# Patient Record
Sex: MALE | Race: OTHER | HISPANIC OR LATINO | Employment: UNEMPLOYED | URBAN - METROPOLITAN AREA
[De-identification: names, ages, dates, MRNs, and addresses within clinical notes are randomized per-mention and may not be internally consistent; named-entity substitution may affect disease eponyms.]

---

## 2023-07-03 ENCOUNTER — HOSPITAL ENCOUNTER (EMERGENCY)
Facility: HOSPITAL | Age: 38
Discharge: HOME | End: 2023-07-03
Attending: EMERGENCY MEDICINE
Payer: MEDICAID

## 2023-07-03 VITALS
DIASTOLIC BLOOD PRESSURE: 83 MMHG | HEIGHT: 66 IN | RESPIRATION RATE: 18 BRPM | OXYGEN SATURATION: 98 % | BODY MASS INDEX: 23.95 KG/M2 | SYSTOLIC BLOOD PRESSURE: 122 MMHG | TEMPERATURE: 98 F | HEART RATE: 75 BPM | WEIGHT: 149 LBS

## 2023-07-03 DIAGNOSIS — T14.8XXA PUNCTURE WOUND: ICD-10-CM

## 2023-07-03 PROCEDURE — 25000003 PHARM REV CODE 250: Mod: ER | Performed by: EMERGENCY MEDICINE

## 2023-07-03 PROCEDURE — 90715 TDAP VACCINE 7 YRS/> IM: CPT | Mod: ER | Performed by: EMERGENCY MEDICINE

## 2023-07-03 PROCEDURE — 63600175 PHARM REV CODE 636 W HCPCS: Mod: ER | Performed by: EMERGENCY MEDICINE

## 2023-07-03 PROCEDURE — 99284 EMERGENCY DEPT VISIT MOD MDM: CPT | Mod: ER

## 2023-07-03 PROCEDURE — 90471 IMMUNIZATION ADMIN: CPT | Mod: ER | Performed by: EMERGENCY MEDICINE

## 2023-07-03 RX ORDER — CIPROFLOXACIN 500 MG/1
500 TABLET ORAL 2 TIMES DAILY
Qty: 18 TABLET | Refills: 0 | Status: SHIPPED | OUTPATIENT
Start: 2023-07-03 | End: 2023-07-12

## 2023-07-03 RX ORDER — CEPHALEXIN 500 MG/1
500 CAPSULE ORAL
Status: COMPLETED | OUTPATIENT
Start: 2023-07-03 | End: 2023-07-03

## 2023-07-03 RX ORDER — CIPROFLOXACIN 500 MG/1
500 TABLET ORAL
Status: COMPLETED | OUTPATIENT
Start: 2023-07-03 | End: 2023-07-03

## 2023-07-03 RX ORDER — CEPHALEXIN 250 MG/1
500 CAPSULE ORAL EVERY 6 HOURS
Qty: 38 CAPSULE | Refills: 0 | Status: SHIPPED | OUTPATIENT
Start: 2023-07-03 | End: 2023-07-08

## 2023-07-03 RX ORDER — ACETAMINOPHEN 500 MG
1000 TABLET ORAL
Status: COMPLETED | OUTPATIENT
Start: 2023-07-03 | End: 2023-07-03

## 2023-07-03 RX ADMIN — ACETAMINOPHEN 1000 MG: 500 TABLET ORAL at 10:07

## 2023-07-03 RX ADMIN — TETANUS TOXOID, REDUCED DIPHTHERIA TOXOID AND ACELLULAR PERTUSSIS VACCINE, ADSORBED 0.5 ML: 5; 2.5; 8; 8; 2.5 SUSPENSION INTRAMUSCULAR at 10:07

## 2023-07-03 RX ADMIN — CEPHALEXIN 500 MG: 500 CAPSULE ORAL at 10:07

## 2023-07-03 RX ADMIN — CIPROFLOXACIN 500 MG: 500 TABLET, FILM COATED ORAL at 10:07

## 2023-07-04 NOTE — ED PROVIDER NOTES
Encounter Date: 7/3/2023       History     Chief Complaint   Patient presents with    Puncture Wound     Pt to the Er after stepping on a nail yesterday. Pt is not taking anything for the pain. Puncture wound to bottom of left foot.     Cecil Hernandez is a 37 y.o. male who  has no past medical history on file.    The patient presents to the ED due to puncture wound to his left foot.  Patient states he stepped on a nail through his sandals yesterday while outside.  His friend saw him limping today prompting him to come here.  Patient has .  His last tetanus shot was unknown.  He has no known medical problems.  He denies associated fever or injuries this time.    A  was offered at no cost and declined.      The history is provided by the patient and a friend.   Review of patient's allergies indicates:  No Known Allergies  History reviewed. No pertinent past medical history.  History reviewed. No pertinent surgical history.  History reviewed. No pertinent family history.  Social History     Tobacco Use    Smoking status: Never    Smokeless tobacco: Never   Substance Use Topics    Alcohol use: Yes    Drug use: Never     Review of Systems   Constitutional:  Negative for fever.   HENT:  Negative for sore throat.    Respiratory:  Negative for shortness of breath.    Cardiovascular:  Negative for chest pain.   Gastrointestinal:  Negative for nausea.   Genitourinary:  Negative for dysuria.   Musculoskeletal:  Positive for gait problem. Negative for back pain.   Skin:  Positive for wound. Negative for rash.   Neurological:  Negative for weakness.   Hematological:  Does not bruise/bleed easily.     Physical Exam     Initial Vitals [07/03/23 2132]   BP Pulse Resp Temp SpO2   138/87 71 18 98.4 °F (36.9 °C) 99 %      MAP       --         Physical Exam    Nursing note and vitals reviewed.  Constitutional: He appears well-developed and well-nourished. He is not diaphoretic. No distress.   HENT:   Head:  Normocephalic and atraumatic.   Eyes: Conjunctivae are normal.   Cardiovascular:  Regular rhythm and intact distal pulses.           Musculoskeletal:      Comments: Left lower extremity: Sensation intact to light touch.  DP/PT pulses palpable.  Puncture wound to plantar surface between the 1st and 2nd metatarsal.  No surrounding warmth erythema or drainage.  It is tender to touch.  No swelling.     Neurological: He is alert.   Skin: Skin is warm and dry. Capillary refill takes less than 2 seconds. No rash noted.   Psychiatric: He has a normal mood and affect.       ED Course   Procedures  Labs Reviewed - No data to display       Imaging Results    None          Medications   Tdap (BOOSTRIX) vaccine injection 0.5 mL (has no administration in time range)   cephALEXin capsule 500 mg (has no administration in time range)   ciprofloxacin HCl tablet 500 mg (has no administration in time range)   acetaminophen tablet 1,000 mg (has no administration in time range)     Medical Decision Making:   Differential Diagnosis:   Differential Diagnosis includes, but is not limited to:  Fracture, dislocation, compartment syndrome, nerve injury/palsy, vascular injury, rhabdomyolysis, hemarthrosis, septic joint, bursitis, muscle strain, ligament tear/sprain, laceration with foreign body, abrasion, soft tissue contusion, osteoarthritis.    ED Management:  Tetanus updated, x-ray were reassuring.  After discussion of risks and benefits of ciprofloxacin including tendon rupture/damage will plan to prescribe to prophylactically treat for Pseudomonas infection.  Will also prescribe Keflex.  Patient acknowledged understanding of these risks.  We discussed follow-up with patient's PCP and ED return precautions for signs of infection especially for redness swelling drainage fever skin changes or any other concerns.  Patient also expressed understanding of this.  All questions answered to his satisfaction.    After taking into careful account the  historical factors and physical exam findings of the patient's presentation today, in conjunction with the empirical and objective data obtained on ED workup, no acute emergent medical condition has been identified. The patient appears to be low risk for an emergent medical condition and I feel it is safe and appropriate at this time for the patient to be discharged to follow-up as detailed in their discharge instructions for reevaluation and possible continued outpatient workup and management. I have discussed the specifics of the workup with the patient and the patient has verbalized understanding of the details of the workup, the diagnosis, the treatment plan, and the need for outpatient follow-up.  Although the patient has no emergent etiology today this does not preclude the development of an emergent condition so in addition, I have advised the patient that they can return to the ED and/or activate EMS at any time with worsening of their symptoms, change of their symptoms, or with any other medical complaint.  The patient remained comfortable and stable during their visit in the ED.  Discharge and follow-up instructions discussed with the patient who expressed understanding and willingness to comply with my recommendations.             ED Course as of 07/04/23 0449   Mon Jul 03, 2023 2235 X-Ray Foot Complete Left  X-Ray Reviewed. Negative for acute fracture  foreign body or dislocation by my independent interpretation, awaiting formal radiology read.   [RN]      ED Course User Index  [RN] Nitin Mcgarry Jr., MD                 Clinical Impression:   Final diagnoses:  [T14.8XXA] Puncture wound  [T14.8XXA] Puncture wound - stepped on a nail yesterday            Portions of this note were dictated using voice recognition software and may contain dictation related errors in spelling/grammar/syntax not found on text review       Nitin Mcgarry Jr., MD  07/04/23 0455